# Patient Record
Sex: MALE | URBAN - NONMETROPOLITAN AREA
[De-identification: names, ages, dates, MRNs, and addresses within clinical notes are randomized per-mention and may not be internally consistent; named-entity substitution may affect disease eponyms.]

---

## 2018-12-28 ENCOUNTER — OFFICE VISIT (OUTPATIENT)
Dept: FAMILY MEDICINE CLINIC | Facility: CLINIC | Age: 28
End: 2018-12-28
Payer: COMMERCIAL

## 2018-12-28 VITALS
OXYGEN SATURATION: 98 % | SYSTOLIC BLOOD PRESSURE: 120 MMHG | DIASTOLIC BLOOD PRESSURE: 70 MMHG | HEIGHT: 72 IN | TEMPERATURE: 98 F | WEIGHT: 233 LBS | BODY MASS INDEX: 31.56 KG/M2 | HEART RATE: 72 BPM

## 2018-12-28 DIAGNOSIS — Z23 NEED FOR VACCINATION: ICD-10-CM

## 2018-12-28 DIAGNOSIS — J45.21 MILD INTERMITTENT ASTHMA WITH ACUTE EXACERBATION: Primary | ICD-10-CM

## 2018-12-28 DIAGNOSIS — L30.8 OTHER ECZEMA: ICD-10-CM

## 2018-12-28 PROCEDURE — 90471 IMMUNIZATION ADMIN: CPT | Performed by: FAMILY MEDICINE

## 2018-12-28 PROCEDURE — 90715 TDAP VACCINE 7 YRS/> IM: CPT | Performed by: FAMILY MEDICINE

## 2018-12-28 PROCEDURE — 99204 OFFICE O/P NEW MOD 45 MIN: CPT | Performed by: FAMILY MEDICINE

## 2018-12-28 RX ORDER — ALBUTEROL SULFATE 90 UG/1
2 AEROSOL, METERED RESPIRATORY (INHALATION) EVERY 4 HOURS PRN
Qty: 1 INHALER | Refills: 2 | Status: SHIPPED | OUTPATIENT
Start: 2018-12-28

## 2018-12-28 RX ORDER — PREDNISONE 20 MG/1
20 TABLET ORAL 2 TIMES DAILY
Qty: 10 TABLET | Refills: 0 | Status: SHIPPED | OUTPATIENT
Start: 2018-12-28 | End: 2019-01-02

## 2018-12-28 RX ORDER — ALBUTEROL SULFATE 90 UG/1
1 AEROSOL, METERED RESPIRATORY (INHALATION) EVERY 6 HOURS PRN
COMMUNITY

## 2018-12-28 NOTE — PROGRESS NOTES
HPI:    Patient ID: Eden Solares is a 33year old male.     Patient presents with:  Establish Care  Asthma: C/O SOB, COUGH    Hx of asthma, worse past several days, +wheezing, +cough, dry,no nocturnal cough  Ran out of albuterol  Usually worse in ranjeet °C), temperature source Temporal, height 72\", weight 233 lb, SpO2 98 %.          ASSESSMENT/PLAN:   Mild intermittent asthma with acute exacerbation  (primary encounter diagnosis)  Need for vaccination  Other eczema  Patient Instructions   I discussed path

## 2025-02-13 NOTE — PATIENT INSTRUCTIONS
I discussed pathophysiology of asthma including triggers, airway bronchospasm and inflammation  Reviewed asthma action plan.   We will start fluticasone 110 mcg 2 puffs twice daily, rinse mouth after use as a controller and will refill albuterol 2 puffs up
Patient

## (undated) NOTE — LETTER
ASTHMA ACTION PLAN for Tee Mandujano     : 1990          Date: 2018    Ruth Adams. [facility] [place], [address] [place] [id number] [phone] 1.   GREEN - GO!  % Perso [] Asthma Action Plan reviewed with patient (and caregiver if necessary) on the phone and mailed copy to patient. Physician Patient Caretaker (if necessary)   Steve Reza.  DO Alicia Lebron Ly